# Patient Record
Sex: MALE | Race: ASIAN | NOT HISPANIC OR LATINO | Employment: FULL TIME | URBAN - METROPOLITAN AREA
[De-identification: names, ages, dates, MRNs, and addresses within clinical notes are randomized per-mention and may not be internally consistent; named-entity substitution may affect disease eponyms.]

---

## 2020-02-14 ENCOUNTER — OFFICE VISIT - HEALTHEAST (OUTPATIENT)
Dept: FAMILY MEDICINE | Facility: CLINIC | Age: 25
End: 2020-02-14

## 2020-02-14 ENCOUNTER — COMMUNICATION - HEALTHEAST (OUTPATIENT)
Dept: TELEHEALTH | Facility: CLINIC | Age: 25
End: 2020-02-14

## 2020-02-14 DIAGNOSIS — Z00.00 ENCOUNTER FOR GENERAL ADULT MEDICAL EXAMINATION WITHOUT ABNORMAL FINDINGS: ICD-10-CM

## 2020-02-14 DIAGNOSIS — Z82.49 FAMILY HISTORY OF MI (MYOCARDIAL INFARCTION): ICD-10-CM

## 2020-02-14 DIAGNOSIS — Z23 NEED FOR IMMUNIZATION AGAINST INFLUENZA: ICD-10-CM

## 2020-02-14 DIAGNOSIS — Z23 IMMUNIZATION DUE: ICD-10-CM

## 2020-02-14 DIAGNOSIS — R63.5 WEIGHT GAIN: ICD-10-CM

## 2020-02-14 LAB
ALBUMIN SERPL-MCNC: 4.3 G/DL (ref 3.5–5)
ALP SERPL-CCNC: 107 U/L (ref 45–120)
ALT SERPL W P-5'-P-CCNC: 83 U/L (ref 0–45)
ANION GAP SERPL CALCULATED.3IONS-SCNC: 11 MMOL/L (ref 5–18)
AST SERPL W P-5'-P-CCNC: 34 U/L (ref 0–40)
BILIRUB SERPL-MCNC: 0.4 MG/DL (ref 0–1)
BUN SERPL-MCNC: 11 MG/DL (ref 8–22)
CALCIUM SERPL-MCNC: 10 MG/DL (ref 8.5–10.5)
CHLORIDE BLD-SCNC: 103 MMOL/L (ref 98–107)
CHOLEST SERPL-MCNC: 218 MG/DL
CO2 SERPL-SCNC: 26 MMOL/L (ref 22–31)
CREAT SERPL-MCNC: 0.87 MG/DL (ref 0.7–1.3)
FASTING STATUS PATIENT QL REPORTED: NO
GFR SERPL CREATININE-BSD FRML MDRD: >60 ML/MIN/1.73M2
GLUCOSE BLD-MCNC: 118 MG/DL (ref 70–125)
HDLC SERPL-MCNC: 35 MG/DL
HIV 1+2 AB+HIV1 P24 AG SERPL QL IA: NEGATIVE
LDLC SERPL CALC-MCNC: 126 MG/DL
POTASSIUM BLD-SCNC: 4.8 MMOL/L (ref 3.5–5)
PROT SERPL-MCNC: 8.6 G/DL (ref 6–8)
SODIUM SERPL-SCNC: 140 MMOL/L (ref 136–145)
TRIGL SERPL-MCNC: 284 MG/DL
TSH SERPL DL<=0.005 MIU/L-ACNC: 0.69 UIU/ML (ref 0.3–5)

## 2020-02-14 ASSESSMENT — MIFFLIN-ST. JEOR: SCORE: 1784.48

## 2020-02-19 ENCOUNTER — COMMUNICATION - HEALTHEAST (OUTPATIENT)
Dept: FAMILY MEDICINE | Facility: CLINIC | Age: 25
End: 2020-02-19

## 2020-02-19 ENCOUNTER — AMBULATORY - HEALTHEAST (OUTPATIENT)
Dept: FAMILY MEDICINE | Facility: CLINIC | Age: 25
End: 2020-02-19

## 2020-02-19 DIAGNOSIS — R79.89 ELEVATED LFTS: ICD-10-CM

## 2020-02-21 ENCOUNTER — AMBULATORY - HEALTHEAST (OUTPATIENT)
Dept: LAB | Facility: CLINIC | Age: 25
End: 2020-02-21

## 2020-02-21 DIAGNOSIS — R79.89 ELEVATED LFTS: ICD-10-CM

## 2020-02-21 LAB
HAV IGM SERPL QL IA: NEGATIVE
HBV CORE IGM SERPL QL IA: NEGATIVE
HBV SURFACE AG SERPL QL IA: NEGATIVE
HCV AB SERPL QL IA: NEGATIVE

## 2020-02-22 LAB — HBV SURFACE AB SERPL IA-ACNC: POSITIVE M[IU]/ML

## 2020-03-04 ENCOUNTER — COMMUNICATION - HEALTHEAST (OUTPATIENT)
Dept: FAMILY MEDICINE | Facility: CLINIC | Age: 25
End: 2020-03-04

## 2020-05-15 ENCOUNTER — OFFICE VISIT - HEALTHEAST (OUTPATIENT)
Dept: FAMILY MEDICINE | Facility: CLINIC | Age: 25
End: 2020-05-15

## 2020-05-15 DIAGNOSIS — R45.86 MOOD CHANGE: ICD-10-CM

## 2020-05-15 DIAGNOSIS — R63.5 WEIGHT GAIN: ICD-10-CM

## 2020-05-15 ASSESSMENT — PATIENT HEALTH QUESTIONNAIRE - PHQ9: SUM OF ALL RESPONSES TO PHQ QUESTIONS 1-9: 0

## 2021-03-31 ENCOUNTER — OFFICE VISIT - HEALTHEAST (OUTPATIENT)
Dept: FAMILY MEDICINE | Facility: CLINIC | Age: 26
End: 2021-03-31

## 2021-03-31 DIAGNOSIS — Z11.1 SCREENING EXAMINATION FOR PULMONARY TUBERCULOSIS: ICD-10-CM

## 2021-03-31 ASSESSMENT — MIFFLIN-ST. JEOR: SCORE: 1794.69

## 2021-04-02 LAB
GAMMA INTERFERON BACKGROUND BLD IA-ACNC: 0.07 IU/ML
M TB IFN-G BLD-IMP: NEGATIVE
MITOGEN IGNF BCKGRD COR BLD-ACNC: 0.16 IU/ML
MITOGEN IGNF BCKGRD COR BLD-ACNC: 0.18 IU/ML
QTF INTERPRETATION: NORMAL
QTF MITOGEN - NIL: 8.3 IU/ML

## 2021-04-05 ENCOUNTER — COMMUNICATION - HEALTHEAST (OUTPATIENT)
Dept: FAMILY MEDICINE | Facility: CLINIC | Age: 26
End: 2021-04-05

## 2021-05-27 ASSESSMENT — PATIENT HEALTH QUESTIONNAIRE - PHQ9: SUM OF ALL RESPONSES TO PHQ QUESTIONS 1-9: 0

## 2021-06-01 ENCOUNTER — RECORDS - HEALTHEAST (OUTPATIENT)
Dept: ADMINISTRATIVE | Facility: CLINIC | Age: 26
End: 2021-06-01

## 2021-06-04 VITALS
OXYGEN SATURATION: 97 % | SYSTOLIC BLOOD PRESSURE: 108 MMHG | DIASTOLIC BLOOD PRESSURE: 70 MMHG | WEIGHT: 189.75 LBS | TEMPERATURE: 97.9 F | BODY MASS INDEX: 30.5 KG/M2 | HEIGHT: 66 IN | HEART RATE: 106 BPM | RESPIRATION RATE: 16 BRPM

## 2021-06-05 VITALS
HEIGHT: 66 IN | WEIGHT: 192 LBS | SYSTOLIC BLOOD PRESSURE: 102 MMHG | TEMPERATURE: 97.9 F | OXYGEN SATURATION: 98 % | DIASTOLIC BLOOD PRESSURE: 68 MMHG | BODY MASS INDEX: 30.86 KG/M2 | HEART RATE: 93 BPM | RESPIRATION RATE: 16 BRPM

## 2021-06-06 NOTE — PROGRESS NOTES
Called patient with lab results. Had an isolated elevated ALT.    Will evaluate for hepatitis.   Differential does include fatty liver, alcohol use, hepatitis.   Denies any alcohol use.     Can come in just for lab visit.     Ender Weinberg MD

## 2021-06-06 NOTE — PROGRESS NOTES
MALE PREVENTATIVE EXAM    Assessment and Plan:     Encounter for general adult medical examination without abnormal findings  Family history of MI (myocardial infarction)  No preventative visits since he was a teenager. Father had a heart attack before the age of 50 this year, wants to check on his general health. Father also had diabetes, morbid obesity, HTN, hypercholesterolemia. See HPI for details.   - Lipid Profile  - Comprehensive Metabolic Panel     Immunization due  Need for immunization against influenza  HIV screening  - Influenza, Seasonal Quad, PF =/> 6months  - HPV vaccine 9 valent 3 dose IM  - HIV Antigen/Antibody Screening [place]    Weight gain  History of thyrotoxicosis, resolved.   - Thyroid Stimulating Hormone (TSH). Normal.   - Lipid Profile - hypercholesterolemia.  - Comprehensive Metabolic Panel, normal.       Next follow up:  Return in about 3 months (around 2020) for mood, weight gain.    Immunization Review  Adult Imm Review: Due today, orders placed    I discussed the following with the patient:   Adult Healthy Living: Importance of regular exercise  Healthy nutrition  Getting adequate sleep  Stress management  Use of seat belts    I have had an Advance Directives discussion with the patient.    Subjective:   Chief Complaint: Emiliano Mauro is an 24 y.o. male here for a preventative health visit.     HPI:    24 year old male with history of thyrotoxicosis (self resolved?) here for general physical. His father  this past fall from a heart attack. His father also had HTN, hypercholesterolemia, obesity. No other known history of heart attacks in the family. Family history updated in chart. He is a never smoker, but exposed to second hand smoke from his older brother who smokes in the house. Not currently exercising regularly because he spends a lot of time taking care of his mom and family after his father . He does not drink alcohol. Denies any other drug use. He has never been sexually  "active. Does not currently take any medications.     General health maintenance: has not seen an eye doctor or dentist in over 2 years.     Healthy Habits  Are you taking a daily aspirin? No  Do you typically exercising at least 40 min, 3-4 times per week?  NO  Do you usually eat at least 4 servings of fruit and vegetables a day, include whole grains and fiber and avoid regularly eating high fat foods? Yes  Have you had an eye exam in the past two years? NO  Do you see a dentist twice per year? NO  Do you have any concerns regarding sleep? YES    Safety Screen  If you own firearms, are they secured in a locked gun cabinet or with trigger locks? The patient does not own any firearms  Do you feel you are safe where you are living?: Yes (2/14/2020 10:34 AM)  Do you feel you are safe in your relationship(s)?: Yes (2/14/2020 10:34 AM)      Review of Systems:  Please see above.  The rest of the review of systems are negative for all systems.     Cancer Screening     Patient has no health maintenance due at this time          Patient Care Team:  Ender Weinberg MD as PCP - General (Family Medicine)        History     Reviewed By Date/Time Sections Reviewed    Ender Weinberg MD 2/17/2020 11:03 AM Medical, Surgical, Tobacco, Family    Alba Montes De Oca CMA 2/14/2020 10:35 AM Tobacco, Alcohol, Drug Use, Sexual Activity            Objective:   Vital Signs:   Visit Vitals  /70   Pulse (!) 106   Temp 97.9  F (36.6  C) (Oral)   Resp 16   Ht 5' 5.75\" (1.67 m)   Wt 189 lb 12 oz (86.1 kg)   SpO2 97%   BMI 30.86 kg/m           PHYSICAL EXAM  General: Alert and oriented, in NAD.  Eyes: PERRL, EOMI, sclera normal.  HENT: Normocephalic, no pharyngeal erythema, MMM.  Neck: Supple, no adenopathy.  Heart: Normal S1 and S2, regular rhythm. No murmurs, gallops, rubs.  Lungs: CTA bilaterally, no wheezes, no crackles, no rhonchi.  Abdomen: Soft, non-tender, non-distended, BS+.   MSK: Normal ROM of upper and lower extremities.  Neuro: " Alert and oriented x 3. Moves all extremities. No focal deficits noted.  Extremities: Peripheral pulses intact, no peripheral edema.  Skin: Warm and well perfused, no rashes noted.  Psych: Normal mood and affect.      Not currently taking any medications.     Additional Screenings Completed Today:   Lipid screen due to family history of MI.         Ender Weinberg MD

## 2021-06-06 NOTE — TELEPHONE ENCOUNTER
----- Message from Ender Weinberg MD sent at 2/19/2020  1:27 PM CST -----  Hepatitis evaluation ordered due to elevated ALT. Please call patient to schedule lab only visit. He is aware of lab tests ordered. Thanks!

## 2021-06-08 NOTE — PROGRESS NOTES
"Emiliano Mauro is a 24 y.o. male who is being evaluated via a billable telephone visit.    Assessment and plan  Emiliano was seen today for follow up and weight gain.    Diagnoses and all orders for this visit:    Weight gain  Mood change  The symptoms back in February were likely due to bereavement.  Patient lost his dad suddenly to heart attack and was taking on a lot of family responsibilities.  They have been adjusting well, he is exercising more, spending time with his family, has good job prospects.  -     PHQ9 Depression Screen, score 0.    Follow-up in 9 months for annual physical, sooner if needed    ===============================================    The patient has been notified of following:     \"This telephone visit will be conducted via a call between you and your physician/provider. We have found that certain health care needs can be provided without the need for a physical exam.  This service lets us provide the care you need with a short phone conversation.  If a prescription is necessary we can send it directly to your pharmacy.  If lab work is needed we can place an order for that and you can then stop by our lab to have the test done at a later time.    Telephone visits are billed at different rates depending on your insurance coverage. During this emergency period, for some insurers they may be billed the same as an in-person visit.  Please reach out to your insurance provider with any questions.    If during the course of the call the physician/provider feels a telephone visit is not appropriate, you will not be charged for this service.\"    Patient has given verbal consent to a Telephone visit? Yes    Patient would like to receive their AVS by AVS Preference: Mail a copy.    Additional provider notes:     Patient is here for follow-up for depressed mood, weight gain.  He expressed concern for unintentional weight gain over the past few months, depressed mood, inability to get a job.  He lost his father in " November 2019 and was taking on a lot of responsibilities for his family.  Since then, Emiliano has some job prospects- they said to wait until after the pandemic before they can offer him an interview.  His brother started working again, his mom has started to go back to work on a half-time basis.  He is able to spend more time with his little brother.  His family likes to go fishing together, spend time outdoors.  His brother works at a factory that makes PPE and he brings home facemasks for his family so they are staying safe during the coronavirus pandemic.    Denies depressed mood, has good energy, denies any suicidal or homicidal ideations, no thoughts of self-harm.  He does not take any medications, all labs drawn at last visit were within normal limits.    Phone call start time 1:44 PM  Phone call end time 1:57 PM  Phone call duration: 13 minutes    Ender Weinberg MD

## 2021-06-16 PROBLEM — Z82.49 FAMILY HISTORY OF MI (MYOCARDIAL INFARCTION): Status: ACTIVE | Noted: 2020-02-17

## 2021-06-16 NOTE — PROGRESS NOTES
"Emiliano Mauro is a 25 y.o. male here for TB testing    ASSESSMENT/PLAN:   Emiliano was seen today for tb test.    Diagnoses and all orders for this visit:    Screening examination for pulmonary tuberculosis  -     QTF-Mycobacterium tuberculosis by QuantiFERON-TB Gold Plus      Will send patient a letter with results and fax letter to  [facility].   Ph: [phone]  Fax: [phone]    Return in about 6 months (around 9/30/2021) for Annual physical.       ======================================================    SUBJECTIVE  Emiliano Mauro is a 25 y.o. male here for TB testing for work.     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 His older brother was previously caring for her but unable now due to legal issues.     Denies any contact with anyone with TB.   No shortness of breath, fevers/chills, night sweats, unexplained weight loss.   No swollen lymph nodes.   Was born outside of the [place], but does not think he had a TB vaccination.       ROS  Complete 10 point review of systems negative except as noted above in HPI    Reviewed Past Medical History, Medications, Family History and Social History in Epic and up to date with no new changes.    OBJECTIVE  /68   Pulse 93   Temp 97.9  F (36.6  C) (Oral)   Resp 16   Ht 5' 5.75\" (1.67 m)   Wt 192 lb (87.1 kg)   SpO2 98%   BMI 31.23 kg/m       General: Cooperative, pleasant, in no acute distress  HEENT: PERRL, EOMI.    Neck: no lymphadenopathy, no masses  CV: RRR, normal S1/S2, no murmur, rubs, gallops  Resp: No respiratory distress. Clear to auscultation bilaterally. No wheezes, rales, rhonchi  MSK: Normal muscle tone  Neuro: CN II-XII intact  Skin: warm, well perfused. No rashes  Psych: No suicidal or homicidal ideations, no self-harm.  Normal affect.    LABS & IMAGES   Results for orders placed or performed in visit on 02/21/20   Hepatitis Acute Evaluation   Result Value Ref Range    Hepatitis A Antibody, IgM Negative Negative    " Hepatitis B Core Chanda, IgM Negative Negative    Hepatitis B Surface Ag Negative Negative    Hepatitis C Ab Negative Negative   Hepatitis B Surface Antibody (Anti-HBs) Vaccine Check   Result Value Ref Range    HBV Surface Ab (Vaccine Check) Positive Positive         ======================================================    Options for treatment and follow-up care were reviewed with the patient. Emiliano Mauro and/or guardian was engaged and actively involved in the decision making process. Emiliano Mauro and/or guardian verbalized understanding of the options discussed and was satisfied with the final plan.      Ender Weinberg MD

## 2021-06-20 NOTE — LETTER
Letter by Ender Weinberg MD at      Author: Ender Weinberg MD Service: -- Author Type: --    Filed:  Encounter Date: 3/4/2020 Status: (Other)         Emiliano Mauro  [address] [facility] [address]             March 4, 2020         Dear Mr. Mauro,    Below are the results from your recent visit:    Resulted Orders   Hepatitis Acute Evaluation   Result Value Ref Range    Hepatitis A Antibody, IgM Negative Negative    Hepatitis B Core Chanda, IgM Negative Negative    Hepatitis B Surface Ag Negative Negative    Hepatitis C Ab Negative Negative   Hepatitis B Surface Antibody (Anti-HBs) Vaccine Check   Result Value Ref Range    HBV Surface Ab (Vaccine Check) Positive Positive       Your labs are normal. Your next appointment is 5/15/2020. No follow up until then.     Please call with questions or contact us using [employer].    Sincerely,        Electronically signed by Ender Weinberg MD

## 2021-06-21 NOTE — LETTER
Letter by Ender Weinberg MD at      Author: Ender Weinberg MD Service: -- Author Type: --    Filed:  Encounter Date: 4/5/2021 Status: (Other)           April 5, 2021        To whom it may concern:    Below are the tests for Emiliano Mauro. His quantiferon gold was negative on 3/31/2021. Quantiferon gold test was done in place of a TB skin test.     Resulted Orders   QTF-Mycobacterium tuberculosis by QuantiFERON-TB Gold Plus   Result Value Ref Range    QTF RESULT Negative Negative    QTF INTREPRETATION       No interferon-gamma response to M. tuberculosis antigens was detected.  Infecton with M. tuberculosis is unlikely.  A negative result alone does not exclude infection with M. tuberculosis    QTF NIL 0.07 IU/mL    QTF ANTIGEN TB1-NIL 0.16 IU/mL    QTF ANTIGEN TB2 - NIL 0.18 IU/mL    QTF MITOGEN-NIL 8.30 IU/mL         Please call with questions    Sincerely,            Ender Weinberg MD

## 2021-08-23 ENCOUNTER — IMMUNIZATION (OUTPATIENT)
Dept: NURSING | Facility: CLINIC | Age: 26
End: 2021-08-23
Payer: COMMERCIAL

## 2021-08-23 PROCEDURE — 91300 PR COVID VAC PFIZER DIL RECON 30 MCG/0.3 ML IM: CPT

## 2021-08-23 PROCEDURE — 0001A PR COVID VAC PFIZER DIL RECON 30 MCG/0.3 ML IM: CPT

## 2021-09-13 ENCOUNTER — IMMUNIZATION (OUTPATIENT)
Dept: NURSING | Facility: CLINIC | Age: 26
End: 2021-09-13
Attending: FAMILY MEDICINE
Payer: COMMERCIAL

## 2021-09-13 PROCEDURE — 0002A PR COVID VAC PFIZER DIL RECON 30 MCG/0.3 ML IM: CPT | Performed by: FAMILY MEDICINE

## 2021-09-13 PROCEDURE — 91300 PR COVID VAC PFIZER DIL RECON 30 MCG/0.3 ML IM: CPT | Performed by: FAMILY MEDICINE

## 2022-08-15 ENCOUNTER — LAB (OUTPATIENT)
Dept: LAB | Facility: CLINIC | Age: 27
End: 2022-08-15
Payer: COMMERCIAL

## 2022-08-15 DIAGNOSIS — Z11.1 SCREENING EXAMINATION FOR PULMONARY TUBERCULOSIS: Primary | ICD-10-CM

## 2022-08-15 DIAGNOSIS — Z11.1 SCREENING EXAMINATION FOR PULMONARY TUBERCULOSIS: ICD-10-CM

## 2022-08-15 PROCEDURE — 86481 TB AG RESPONSE T-CELL SUSP: CPT

## 2022-08-15 PROCEDURE — 36415 COLL VENOUS BLD VENIPUNCTURE: CPT

## 2022-08-15 NOTE — PROGRESS NOTES
Patient had negative quant gold last year, ok to repeat this year.   Employer is requesting.     Ender Weinberg MD

## 2022-08-16 LAB
GAMMA INTERFERON BACKGROUND BLD IA-ACNC: 0.12 IU/ML
M TB IFN-G BLD-IMP: POSITIVE
M TB IFN-G CD4+ BCKGRND COR BLD-ACNC: 9.88 IU/ML
MITOGEN IGNF BCKGRD COR BLD-ACNC: 0.26 IU/ML
MITOGEN IGNF BCKGRD COR BLD-ACNC: 0.36 IU/ML
QUANTIFERON MITOGEN: 10 IU/ML
QUANTIFERON NIL TUBE: 0.12 IU/ML
QUANTIFERON TB1 TUBE: 0.48 IU/ML
QUANTIFERON TB2 TUBE: 0.38

## 2023-01-04 ENCOUNTER — TELEPHONE (OUTPATIENT)
Dept: FAMILY MEDICINE | Facility: CLINIC | Age: 28
End: 2023-01-04

## 2023-01-04 NOTE — TELEPHONE ENCOUNTER
Discussed with Dr. Weinberg in person recommended a follow up appointment to be seen for referral to chest x-ray.  Date, time and location of future appt confirmed with patient.     JUS MartinsN, RN  [facility]

## 2023-01-04 NOTE — TELEPHONE ENCOUNTER
Ender Weinberg MD   1/4/2023  9:33 AM CST Back to Top    Please call patient to schedule appointment to discuss lab results, specifically TB results. He will need further testing because it was positive. I have not been able to reach him by phone. Will need to send a letter if unable to reach.     Able to reach patient via telephone call to relay provider test message.  Will route encounter to provider for the next step.    RUBIA Martins, RN  [facility]

## 2023-01-09 ENCOUNTER — ANCILLARY PROCEDURE (OUTPATIENT)
Dept: GENERAL RADIOLOGY | Facility: CLINIC | Age: 28
End: 2023-01-09
Attending: FAMILY MEDICINE
Payer: COMMERCIAL

## 2023-01-09 ENCOUNTER — OFFICE VISIT (OUTPATIENT)
Dept: FAMILY MEDICINE | Facility: CLINIC | Age: 28
End: 2023-01-09
Payer: COMMERCIAL

## 2023-01-09 VITALS
SYSTOLIC BLOOD PRESSURE: 122 MMHG | WEIGHT: 197.5 LBS | OXYGEN SATURATION: 98 % | TEMPERATURE: 98.5 F | DIASTOLIC BLOOD PRESSURE: 72 MMHG | HEART RATE: 105 BPM | BODY MASS INDEX: 32.9 KG/M2 | HEIGHT: 65 IN

## 2023-01-09 DIAGNOSIS — Z23 NEED FOR IMMUNIZATION AGAINST INFLUENZA: ICD-10-CM

## 2023-01-09 DIAGNOSIS — R76.11 POSITIVE TB TEST: Primary | ICD-10-CM

## 2023-01-09 DIAGNOSIS — R76.11 POSITIVE TB TEST: ICD-10-CM

## 2023-01-09 PROCEDURE — 71046 X-RAY EXAM CHEST 2 VIEWS: CPT | Mod: TC | Performed by: RADIOLOGY

## 2023-01-09 PROCEDURE — 99213 OFFICE O/P EST LOW 20 MIN: CPT | Performed by: FAMILY MEDICINE

## 2023-01-09 NOTE — PROGRESS NOTES
"  Assessment & Plan     Need for immunization against influenza  Declines flu shot today    Positive TB test  No symptoms. Most likely LTBI. CXR today, and once results are available, referral to [facility]. Discussed briefly LTBI treatment with patient. Patient agreeable to referral.   - XR Chest 2 Views               BMI:   Estimated body mass index is 32.91 kg/m  as calculated from the following:    Height as of this encounter: 1.65 m (5' 4.96\").    Weight as of this encounter: 89.6 kg (197 lb 8 oz).           No follow-ups on file.    Sue Gonsalves MD  [facility] BLAIR Cummings is a 27 year old, presenting for the following health issues:  RECHECK (TB)  Here due to pos quantiferon GOLD TB screen in August - difficulty reaching patient with results until recently. 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    No cough, hemoptysis, fever, night sweats, weight loss  No known TB exposure  Quantiferon gold 3/31/21 neg, 8/15/22 pos    Born in [place]  Came to [place] in [place]          Review of Systems         Objective    /72 (BP Location: Left arm, Cuff Size: Adult Regular)   Pulse 105   Temp 98.5  F (36.9  C) (Oral)   Ht 1.65 m (5' 4.96\")   Wt 89.6 kg (197 lb 8 oz)   SpO2 98%   BMI 32.91 kg/m    Body mass index is 32.91 kg/m .  Physical Exam   GENERAL: healthy, alert and no distress  EYES: Eyes grossly normal to inspection, PER and conjunctivae and sclerae normal  RESP: lungs clear to auscultation - no rales, rhonchi or wheezes  CV: regular rate and rhythm, normal S1 S2, no S3 or S4, no murmur, click or rub, no peripheral edema  MS: no gross musculoskeletal defects noted, no edema                    "

## 2023-01-10 ENCOUNTER — TELEPHONE (OUTPATIENT)
Dept: FAMILY MEDICINE | Facility: CLINIC | Age: 28
End: 2023-01-10

## 2023-01-10 NOTE — TELEPHONE ENCOUNTER
Pt calling back and relayed Dr. Gonsalves's message below.  Spoke with RN who said TB clinic will call pt to schedule.  Pt understands. Thanks    Call taken on 1/12/23 at 10 am by HAILEY Adrian

## 2023-01-10 NOTE — TELEPHONE ENCOUNTER
Called pt in an attempt to relay lab result note. Could not reach pt. Left message to call back. 1/9 visit note, 1/9 CXR, and 8/15 TB lab printed and faxed to [facility] at [phone].    If pt calls back, please relay Dr. Gonsalves's message below. Thanks.    RUBIA Braga Cem, RN  [facility]    ----- Message from Sue Gonsalves MD sent at 1/9/2023  6:00 PM CST -----  CXR is negative/normal. Please fax my visit note from 1/9, positive quantiferon gold results from August, and the CXR report from 1/9 to [facility] to refer this patient for latent TB infection treatment. TB clinic phone number is [phone]. Please also notify patient of normal CXR.

## 2023-01-11 ENCOUNTER — NURSE TRIAGE (OUTPATIENT)
Dept: NURSING | Facility: CLINIC | Age: 28
End: 2023-01-11

## 2023-01-11 NOTE — TELEPHONE ENCOUNTER
[facility] TB results clinic calling to get the patient's address because what they have isn't accurate. I gave her what is in the chart.  Ann Foreman RN  [facility]    Reason for Disposition    Information only question and nurse able to answer    Additional Information    Negative: Nursing judgment    Negative: Nursing judgment    Negative: Nursing judgment    Negative: Nursing judgment    Protocols used: INFORMATION ONLY CALL - NO TRIAGE-A-[place]

## 2023-01-31 ENCOUNTER — TRANSFERRED RECORDS (OUTPATIENT)
Dept: HEALTH INFORMATION MANAGEMENT | Facility: CLINIC | Age: 28
End: 2023-01-31
Payer: COMMERCIAL

## 2023-03-01 ENCOUNTER — TRANSFERRED RECORDS (OUTPATIENT)
Dept: HEALTH INFORMATION MANAGEMENT | Facility: CLINIC | Age: 28
End: 2023-03-01
Payer: COMMERCIAL